# Patient Record
(demographics unavailable — no encounter records)

---

## 2018-01-01 NOTE — DISCHARGE SUMMARY
Providers





- Providers


Date of Admission: 


18 23:50





Date of discharge: 18


Attending physician: 


RAY SKINNER MD





Primary care physician: 


Mother has appt scheduled for her twins for tomorrow at 0930; Ruth Pablo 

[facility] 





Hospitalization


Reason for admission: 


Condition: Good


Pertinent studies: 





 Laboratory Tests











  18





  00:00 13:30 06:30


 


Total Bilirubin  6.20 H  0.60  9.60 H


 


Direct Bilirubin  0.2  < 0.2  0.3 H


 


Indirect Bilirubin  6.0  0.4  9.3











Hospital course: 





Term female twin A; po feeding well, last taking 2 oz from bottle; voiding and 

stooling adequately.  Infant with 10% weight loss since birth, however on 

physical exam, she looks very well, and when reweighed today, had lost only 26 

grams. weighing 2450 grams.  Because of the morrow difference in weights of the 

twins, and how well they both look, I question the accuracy of this infant's 

birth weight.  Explained to mother that we prefer no more than 10% weight loss 

after birth and stressed the importance of follow up for this infant within 24 

hours.  She has a peds appt tomorrow morning scheduled.  Infant with serum bili 

in low intermediate risk at 55 hrs at 9.6 mg/dl.  Will allow for d/c pending 

car seat test passing.  


Disposition: DC-01 TO HOME OR SELFCARE


Time spent for discharge: 15 min





- Discharge Diagnoses


(1) Twin liveborn infant, delivered vaginally


Status: Acute   





Core Measure Documentation





- Palliative Care


Palliative Care/ Comfort Measures: Not Applicable





- Core Measures


Any of the following diagnoses?: none





Exam





- Constitutional


Vitals: 


 











Temp Pulse Resp BP Pulse Ox


 


 98.3 F   120   48       


 


 18 01:38  18 01:38  18 01:38      











General appearance: Present: no acute distress, well-nourished





- EENT


Eyes: Present: PERRL, EOM intact


ENT: hearing intact, clear oral mucosa





- Neck


Neck: Present: supple, normal ROM





- Respiratory


Respiratory effort: normal


Respiratory: bilateral: CTA





- Cardiovascular


Rhythm: regular


Heart Sounds: Present: S1 & S2.  Absent: rub, click





- Extremities


Extremities: no ischemia, pulses intact, pulses symmetrical, No edema, normal 

temperature, normal color, Full ROM


Peripheral Pulses: within normal limits





- Abdominal


General gastrointestinal: Present: soft, non-tender, non-distended, normal 

bowel sounds


Female genitourinary: Present: normal





- Rectal


Rectal Exam: normal exam-external/orifice





- Integumentary


Integumentary: Present: clear, warm, dry, jaundice, normal turgor





- Musculoskeletal


Musculoskeletal: gait normal, strength equal bilaterally





- Psychiatric


Psychiatric: appropriate mood/affect, intact judgment & insight





- Neurologic


Neurologic: CNII-XII intact, moves all extremities





- Additional findings


Additional findings: 





 Intake & Output











 18





 23:59 23:59 23:59 23:59


 


Intake Total  110 183 68


 


Balance  110 183 68


 


Weight  2.775 kg 2.435 kg 2.474 kg














- Allied Health


Allied health notes reviewed: nursing





Plan


Activity: no restrictions


Diet: regular


Additional Instructions: Peds to follow  metabolic screening results.

## 2018-01-01 NOTE — PROGRESS NOTE
Assessment and Plan


Nutrition:  Mother is breast and bottle feeding.  Monitor weight, I/O.  Support 

lactation.  


ID:  maternal labs negative, GBS negative.  Maternal HSV positive, on Valtrex, 

no lesions.  Monitor for s/s of illness.


Heme:  Maternal blood type A+.  TSB 6.2/24 hours.  Continue to monitor per 

protocol.


Social:  Mother updated at bedside.


Discharge:  F/U ped will be [facility]. Anticipate d/c tomorrow am. 





Subjective


Date of service: 18


Principal diagnosis: [place]


Interval history: 





Well appearing 38 week infant, Twin A.  PO feeding well, breast and bottle.  

Voiding and stooling adequately.  TcB within parameters. 





Objective





- Exam


Narrative Exam: 





Well appearing 38 week infant, Twin A.





- Vital Signs


Vital Signs: 


 Vital Signs











  Temp Pulse Resp


 


 18 08:25  99.5 F  120  42


 


 18 00:00  98.4 F  120  36


 


 18 20:20  98.2 F  119  30


 


 18 16:15  97.8 F  118  58


 


 18 12:43  98 F  126  40








 Intake and Output











 18





 23:59 07:59 15:59


 


Intake Total 20 100 


 


Balance 20 100 


 


Intake:   


 


  Oral Amount (ml) 20 100 


 


    Similac Advance 20 100 


 


Other:   


 


  # Voids   


 


    Diaper 1 1 


 


  # Bowel Movements 1 1 


 


  Weight  2.435 kg 








 Patient Weight











 18





 23:59


 


Weight 2.435 kg














- General Appearance


well appearing





- HENT


HENT: EOM normal


Pupils: bilateral: normal





- Neck


normal position





- Respiratory- Lungs


Inspection: symmetric


Auscultation: clear and equal





- Cardiovascular


Cardiovascular: pulse normal, regular rhythm


Precordial activity: normal





- Gastrointestinal


soft, normal BS





- Genitourinary


Genitourinary: normal


Rectum/Anus: normal





- Integumentary


jaundice (Mild jaundice)





- Neurological


reflexes normal





- Musculoskeletal


normal





- Labs


 Abnormal lab results











  18 Range/Units





  00:00 


 


Total Bilirubin  6.20 H  (0.1-1.2)  mg/dL

## 2018-01-01 NOTE — HISTORY AND PHYSICAL REPORT
History of Present Illness


Date of examination: 18


Date of admission: 


18 23:50





History of present illness: 





2775 gm 38wk female, Twin A, born to a 31 yo [id number] mother with pregnancy 

complicated by dichorionic-diamniotic twin gestation. GBS-. On Valtrex 

suppression for +HSV1 antibodies starting 4 days prior to a scheduled 

induction. SROM ~ 20 minutes prior to . APGARs 8/9. Breast and bottle 

feeding. F/U with [facility].





[place] Documentation





- Maternal Info


Infant Delivery Method: Spontaneous Vaginal


Prenatal Events: None


Maternal Blood Type: A (+) positive


HbsAg: Negative


HIV: Negative


RPR/VDRL: Non-reactive


Chlamydia: Negative


Gonorrhea: Negative


Herpes: Positive


Group Beta Strep: Negative


Rubella: Immune


Amniotic Membrane Rupture Date: 18


Amniotic Membrane Rupture Time: 23:30





- Birth


Birth information: 








Delivery Date                    18


Delivery Time                    23:50


1 Minute Apgar                   8


5 Minute Apgar                   9


Gestational Age                  38


Birthweight                      2.775 kg


Height                           18 in


[place] Head Circumference       33


 Chest Circumference      31


Abdominal Girth                  25











Exam


 Vital Signs











Temp Pulse Resp


 


 98.1 F   140   40 


 


 18 00:36  18 00:36  18 00:36








 











Temp Pulse Resp BP Pulse Ox


 


 98 F   126   40       


 


 18 12:43  18 12:43  18 12:43      














- General Appearance


General appearance: Positive: AGA





- Constitutional


normal weight





- Skin


Positive: intact





- HEENT


Head: normocephalic, molding


Fontanel: Positive: soft, flat


Eyes: Positive: ROSCOE, clear, red reflex


Pupils: bilateral: normal





- Nose


Nose: Positive: patent





- Ears


Auricles: normal





- Mouth


Mouth/tongue: palate intact


Oropharynx: normal





- Throat/Neck


Throat/Neck: no masses, clavicle intact





- Chest/Lungs


Inspection: symmetric, normal expansion


Auscultation: clear and equal





- Cardiovascular


Femoral pulse/perfusion: equal bilaterally, capillary refill <3 sec.


Cardiovascular: regular rate, regular rhythm, no murmur





- Gastrointestinal


Positive: soft, normal BS





- Genitourinary


Genitourinary: labia majora covers labia minora


Buttocks/rectum/anus: Positive: anus patent





- Musculoskeletal


Spine: Positive: flat and straight when prone


Musculoskeletal: Positive: normal





- Neurological


Positive: symmetrical movement, strength/tone in all extremities





- Reflexes


Reflexes: reflexes normal





Assessment and Plan





- Patient Problems


(1) Twin liveborn infant, delivered vaginally


Current Visit: Yes   Status: Acute   


Plan to address problem: 


Monitor temperature; assure feeding vigor prior to discharge.








Plan





- Provider Discharge Summary





- Follow Up Plan